# Patient Record
Sex: FEMALE | Race: WHITE | URBAN - METROPOLITAN AREA
[De-identification: names, ages, dates, MRNs, and addresses within clinical notes are randomized per-mention and may not be internally consistent; named-entity substitution may affect disease eponyms.]

---

## 2018-06-25 ENCOUNTER — THERAPY VISIT (OUTPATIENT)
Dept: OCCUPATIONAL THERAPY | Facility: CLINIC | Age: 58
End: 2018-06-25
Payer: COMMERCIAL

## 2018-06-25 DIAGNOSIS — M79.645 PAIN OF LEFT THUMB: ICD-10-CM

## 2018-06-25 DIAGNOSIS — Z47.89 AFTERCARE FOLLOWING SURGERY OF THE MUSCULOSKELETAL SYSTEM: ICD-10-CM

## 2018-06-25 DIAGNOSIS — S62.522A CLOSED DISPLACED FRACTURE OF DISTAL PHALANX OF LEFT THUMB, INITIAL ENCOUNTER: Primary | ICD-10-CM

## 2018-06-25 DIAGNOSIS — S53.32XA TRAUMATIC RUPTURE OF LEFT ULNAR COLLATERAL LIGAMENT, INITIAL ENCOUNTER: ICD-10-CM

## 2018-06-25 PROCEDURE — 97110 THERAPEUTIC EXERCISES: CPT | Mod: GO | Performed by: OCCUPATIONAL THERAPIST

## 2018-06-25 PROCEDURE — 97165 OT EVAL LOW COMPLEX 30 MIN: CPT | Mod: GO | Performed by: OCCUPATIONAL THERAPIST

## 2018-06-25 PROCEDURE — 97140 MANUAL THERAPY 1/> REGIONS: CPT | Mod: GO | Performed by: OCCUPATIONAL THERAPIST

## 2018-06-25 NOTE — MR AVS SNAPSHOT
After Visit Summary   6/25/2018    Nusrat Romero    MRN: [id number]           Patient Information     Date Of Birth          1960        Visit Information        Provider Department      6/25/2018 8:00 AM Yodit Lewis OT [facility]        Today's Diagnoses     Closed displaced fracture of distal phalanx of left thumb, initial encounter    -  1    Traumatic rupture of left ulnar collateral ligament, initial encounter        Pain of left thumb        Aftercare following surgery of the musculoskeletal system           Follow-ups after your visit        Your next 10 appointments already scheduled     Jul 02, 2018  7:30 AM CDT   [facility]ET Hand with Yodit Lewis OT   [facility]    [address]  [address]  [place] [address] [phone]            Jul 09, 2018  7:30 AM CDT   LIZET Hand with Yodit Lewis OT   [facility]    [address]  [address]  [place] [address] [phone]              Who to contact     If you have questions or need follow up information about today's clinic visit or your schedule please contact [facility] directly at [phone].  Normal or non-critical lab and imaging results will be communicated to you by Flypaperhart, letter or phone within 4 business days after the clinic has received the results. If you do not hear from us within 7 days, please contact the clinic through Flypaperhart or phone. If you have a critical or abnormal lab result, we will notify you by phone as soon as possible.  Submit refill requests through The Fabric or call your pharmacy and they will forward the refill request to us. Please allow 3 business days for your refill to be completed.          Additional Information About Your Visit        FlypaperharAmorelie Information     The Fabric lets you send messages to your doctor, view your test results, renew your prescriptions, schedule  "appointments and more. To sign up, go to [email or web address] . Click on \"Log in\" on the left side of the screen, which will take you to the Welcome page. Then click on \"Sign up Now\" on the right side of the page.     You will be asked to enter the access code listed below, as well as some personal information. Please follow the directions to create your username and password.     Your access code is: [email or web address]  Expires: 2018  1:08 PM     Your access code will  in 90 days. If you need help or a new code, please call your [place] clinic or [phone].        Care EveryWhere ID     This is your Care EveryWhere ID. This could be used by other organizations to access your [place] medical records  [id number]         Blood Pressure from Last 3 Encounters:   No data found for BP    Weight from Last 3 Encounters:   No data found for Wt              We Performed the Following     HC OT EVAL, LOW COMPLEXITY     LIZET INITIAL EVAL REPORT     MANUAL THER TECH,1+REGIONS,EA 15 MIN     THERAPEUTIC EXERCISES        Primary Care Provider Fax #    Physician No Ref-Primary [phone]       No address on file        Equal Access to Services     CHRISTIANE BRUNNER : Hadii aad ku hadliliyao Soramuali, waaxda luqadaha, qaybta kaalmada adebettyyada, rosy schilling. So [facility] [phone].    ATENCIÓN: Si habla español, tiene a olea disposición servicios gratuitos de asistencia lingüística. Llame al [phone].    We comply with applicable federal civil rights laws and [place] laws. We do not discriminate on the basis of race, color, national origin, age, disability, sex, sexual orientation, or gender identity.            Thank you!     Thank you for choosing [facility]  for your care. Our goal is always to provide you with excellent care. Hearing back from our patients is one way we can continue to improve our services. Please take a few minutes to complete the written survey that you may " receive in the mail after your visit with us. Thank you!             Your Updated Medication List - Protect others around you: Learn how to safely use, store and throw away your medicines at www.disposemymeds.org.      Notice  As of 6/25/2018  1:08 PM    You have not been prescribed any medications.

## 2018-06-25 NOTE — PROGRESS NOTES
Hand Therapy Initial Evaluation    Current Date:  6/25/2018  Referring Physician: Dr. Barrett    Subjective:  Nusrat Romero is a 57 year old right hand dominant female.    Diagnosis:1. Left thumb comminuted fracture of the distal phalanx        2. Rupture of the ulnar collateral ligament, left thumb  DOI:  4/21/18  DOS:  5/4/18  Procedure: 1. Closed reduction and K-wire pinning of left thumb distal phalanx fracture.                     2. Ulnar collateral ligament repair using a Mitek anchor screw.   Post: 7w 3d    Patient reports symptoms of pain, stiffness/loss of motion, weakness/loss of strength, edema and numbness of the left thumb  which occurred due to a fall after tripping on a curb. Since onset symptoms are gradually getting better.  Special tests:  x-rays, CT scan.  Previous treatment: surgery, cast (removed 6-18-18).    General health as reported by patient is good.  Pertinent medical history includes:Overweight, Smoking  Medical allergies:none.  Surgical history: orthopedic: left knee reconstruction.  Medication history: Priolsec,  Ditropan.    Occupational Profile Information:  Current occupation is [employer]   Currently working in normal job without restrictions  Job Tasks: Computer Work  Barriers include:none  Prior functional level:  no limitations  Mobility: No difficulty  Transportation: drives  Leisure activities/hobbies: fishing, painting ceramics, gardening    Upper Extremity Functional Index Score:  SCORE:   Column Totals: /80: 51   (A lower score indicates greater disability.)      Objective:  Pain:    VAS(0-10) 6/25/18   At Rest:  2/10   With Use:  9/10   At Worst  9/10     Report of Pain:  Location: left thumb   Description: Ache,  Sharp, Shooting,Throbbing  Frequency: Intermittent  Duration: Worse at night  Exacerbated by: Lifting, gripping, pinching, pulling, activity, bumping  Relieved by: ice,  rest  Progression:  gradually improving    ROM:    Thumb 6/25/18 6/25/18   AROM(PROM)  Right Left   MP -10/65 -15/32   IP +60/60 -5/10   RAbd /55 /50   PAbd 50 40   Retropulsion     Opposition  Kapandji Opposition Scale (0-10/10) 10/10 7/10       Strength:  [x]                    Contraindicated    Edema:  []         None   []         Mild    []         Moderate      []         Severe                  Location:  Edema - Measured circumferentially in cm  Date 6/25/18         Right Left Right Left Right Left Right Left Right Left Right Left                   P1 6.1 7.7             IP 6.0 7.7                                              Color/Temperature:     [x]        Normal  []        Abnormal    Wound(s):  [x]          NA       []        Open    []        Closed    []        Sutured        []        Drainage     Scar:  []        NA           [x]      Adherent      []       Non-adherent       []       Raised     []       Flattened        (L) ulnar side of thumb MP        Palpation:  []         Normal        [x]       Tender       []      Mild         []       Moderate [x]       Severe    Location: (L) ulnar MCP joint and dorsal DIP joint area    Sensation: []                   WNL throughout all nerve distributions; per patient report    [x]                   Decreased  []        Median    []        Ulnar    []        Radial nerve distribution  Patient reports numbness on the ulnar side of the thumb from the IP joint to tip    Assessment:  Patient presents with symptoms consistent with diagnosis of thumb fracture and rupture of ulnar collateral ligament,  with surgical  intervention.     Patient's limitations or Problem List includes:  Pain, Decreased ROM/motion, Increased edema, Weakness, Sensory disturbance, Adherent scarring, Decreased  and Decreased pinch of the left thumb which interferes with the patient's ability to perform Self Care Tasks (dressing), Work Tasks, Recreational Activities and Household Chores as compared to previous level of function.    Rehab Potential:  Excellent - Return  to full activity, no limitations    Patient will benefit from skilled Occupational Therapy to increase ROM, flexibility, overall strength,  strength, pinch strength, stability of thumb, coordination and dexterity and decrease pain, edema and adherence of scarring to return to previous activity level and resume normal daily tasks and to reach their rehab potential.    Barriers to Learning:  No barrier    Communication Issues:  Patient appears to be able to clearly communicate and understand verbal and written communication and follow directions correctly.    Chart Review: Brief history including review of medical and/or therapy records relating to the presenting problem and Simple history review with patient    Identified Performance Deficits: dressing, home establishment and management, meal preparation and cleanup and work    Assessment of Occupational Performance:  3-5 Performance Deficits    Clinical Decision Making (Complexity): Low complexity    Treatment Explanation:  The following has been discussed with the patient:  RX ordered/plan of care  Anticipated outcomes  Possible risks and side effects    Frequency:  1 X week, once daily  Duration:  for 8 weeks  Treatment Plan:  Modalities:  Fluidotherapy and Paraffin  Therapeutic Exercise:  AROM, AAROM, PROM, Tendon Gliding, Blocking, Reverse Blocking, Place and Hold and Isotonics  Manual Techniques:  Joint mobilization, Scar mobilization and Manual edema mobilization  Discharge Plan:  Achieve all LTG.  Independent in home treatment program.  Reach maximal therapeutic benefit.      Home Exercise Program:  A/PROM of thumb joints  BROM of thumb MP and IP  Scar massage  Coban wrap for swelling    Next Visit:  Heat if indicated  A/PROM of thumb joints  BROM of thumb MP and IP  Scar massage

## 2018-07-02 ENCOUNTER — THERAPY VISIT (OUTPATIENT)
Dept: OCCUPATIONAL THERAPY | Facility: CLINIC | Age: 58
End: 2018-07-02
Payer: COMMERCIAL

## 2018-07-02 DIAGNOSIS — S62.522A CLOSED DISPLACED FRACTURE OF DISTAL PHALANX OF LEFT THUMB, INITIAL ENCOUNTER: ICD-10-CM

## 2018-07-02 DIAGNOSIS — Z47.89 AFTERCARE FOLLOWING SURGERY OF THE MUSCULOSKELETAL SYSTEM: ICD-10-CM

## 2018-07-02 DIAGNOSIS — M79.645 PAIN OF LEFT THUMB: ICD-10-CM

## 2018-07-02 DIAGNOSIS — S53.32XA TRAUMATIC RUPTURE OF LEFT ULNAR COLLATERAL LIGAMENT, INITIAL ENCOUNTER: ICD-10-CM

## 2018-07-02 PROCEDURE — 97110 THERAPEUTIC EXERCISES: CPT | Mod: GO | Performed by: OCCUPATIONAL THERAPIST

## 2018-07-02 PROCEDURE — 97140 MANUAL THERAPY 1/> REGIONS: CPT | Mod: GO | Performed by: OCCUPATIONAL THERAPIST

## 2018-07-02 NOTE — PROGRESS NOTES
SOAP Note - Hand Therapy - Objective Information    Current Date:  7/2/2018  Referring Physician: Dr. Arnold PIEDRA visit: 7/16/18    Nusrat Romero is a 57 year old right hand dominant female.    Diagnosis:1. Left thumb comminuted fracture of the distal phalanx        2. Rupture of the ulnar collateral ligament, left thumb  DOI:  4/21/18  DOS:  5/4/18  Procedure: 1. Closed reduction and K-wire pinning of left thumb distal phalanx fracture.                     2. Ulnar collateral ligament repair using a Mitek anchor screw.   Post: 8w 3d    Patient reports symptoms of pain, stiffness/loss of motion, weakness/loss of strength, edema and numbness of the left thumb  which occurred due to a fall after tripping on a curb.     Occupational Profile Information:  Current occupation is [employer]    S:  Subjective changes as noted by patient: The thumb is OK but it really swells up by the end of the day. It is stiffer when I wake up in the morning.  Functional changes noted by patient: Using the thumb more while typing.      O:  Pain:    VAS(0-10) 6/25/18 7/2/18   At Rest:  2/10 2/10   With Use:  9/10 5/10   At Worst  9/10 5/10     Report of Pain:  Location: left thumb   Description: Ache,  Sharp, Shooting,Throbbing  Frequency: Intermittent  Duration: Worse at night  Exacerbated by: Lifting, gripping, pinching, pulling, activity, bumping  Relieved by: ice,  rest  Progression:  gradually improving    ROM:    Thumb 6/25/18 6/25/18 7/2/18   AROM(PROM) Right Left Left post   MP -10/65 -15/32 -3/52   IP +60/60 -5/10 -5/28   RAbd /55 /50    PAbd 50 40    Retropulsion      Opposition  Kapandji Opposition Scale (0-10/10) 10/10 7/10        Strength:  [x]                    Contraindicated    Edema:  []         None   []         Mild    []         Moderate      []         Severe                  Location:  Edema - Measured circumferentially in cm  Date 6/25/18 7/2/18        Right Left Right Left Right Left Right Left Right Left  Right Left                   P1 6.1 7.7  7.5           IP 6.0 7.7  7.1                                               Scar:  []        NA           [x]      Adherent      []       Non-adherent       []       Raised     []       Flattened        (L) ulnar side of thumb MP        Palpation:  []         Normal        [x]       Tender       []      Mild         []       Moderate [x]       Severe    Location: (L) ulnar MCP joint and dorsal DIP joint area    Sensation: []                   WNL throughout all nerve distributions; per patient report    [x]                   Decreased  []        Median    []        Ulnar    []        Radial nerve distribution  Patient reports numbness on the ulnar side of the thumb from the IP joint to tip  Please refer to the daily flowsheet for treatment provided today.     Home Exercise Program:  A/PROM of thumb joints  BROM of thumb MP and IP  Scar massage  Coban wrap for swelling  PROM of thumb MP & IP as tolerated    Next Visit:  Heat if indicated  A/PROM of thumb joints  BROM of thumb MP and IP  Scar massage

## 2018-07-02 NOTE — MR AVS SNAPSHOT
After Visit Summary   7/2/2018    Nusrat Romero    MRN: [id number]           Patient Information     Date Of Birth          1960        Visit Information        Provider Department      7/2/2018 7:30 AM Yodit Lewis, OT [facility]        Today's Diagnoses     Closed displaced fracture of distal phalanx of left thumb, initial encounter        Traumatic rupture of left ulnar collateral ligament, initial encounter        Pain of left thumb        Aftercare following surgery of the musculoskeletal system           Follow-ups after your visit        Your next 10 appointments already scheduled     Jul 09, 2018  7:30 AM CDT   LIZET Hand with Yodit Lewis, OT   [facility] ([facility] [facility])    [address]  [address]  [place] [address] [phone]            Jul 19, 2018  7:30 AM CDT   LIZET Hand with Yodit Lewis, OT   [facility] ([facility] [facility])    [address]  [address]  [place] [address] [phone]            Jul 23, 2018  7:30 AM CDT   LIZET Hand with Yodit Lewis, OT   [facility] ([facility])    [address]  [address]  [place] [address] [phone]              Who to contact     If you have questions or need follow up information about today's clinic visit or your schedule please contact [facility] directly at [phone].  Normal or non-critical lab and imaging results will be communicated to you by MyChart, letter or phone within 4 business days after the clinic has received the results. If you do not hear from us within 7 days, please contact the clinic through MyChart or phone. If you have a critical or abnormal lab result, we will notify you by phone as soon as possible.  Submit refill requests through LibertadCard or call your pharmacy and they will forward the refill request to us. Please allow 3 business days for your refill to be  "completed.          Additional Information About Your Visit        IngenyharMyClean Information     SkillSurvey lets you send messages to your doctor, view your test results, renew your prescriptions, schedule appointments and more. To sign up, go to [email or web address] . Click on \"Log in\" on the left side of the screen, which will take you to the Welcome page. Then click on \"Sign up Now\" on the right side of the page.     You will be asked to enter the access code listed below, as well as some personal information. Please follow the directions to create your username and password.     Your access code is: [email or web address]  Expires: 2018  1:08 PM     Your access code will  in 90 days. If you need help or a new code, please call your [place] clinic or [phone].        Care EveryWhere ID     This is your Care EveryWhere ID. This could be used by other organizations to access your [place] medical records  [id number]         Blood Pressure from Last 3 Encounters:   No data found for BP    Weight from Last 3 Encounters:   No data found for Wt              We Performed the Following     MANUAL THER TECH,1+REGIONS,EA 15 MIN     THERAPEUTIC EXERCISES        Primary Care Provider Fax #    Physician No Ref-Primary [phone]       No address on file        Equal Access to Services     KIP BRUNNER : Hadii brian ku hadasho Soramuali, waaxda luqadaha, qaybta kaalmada adeegyada, rosy ryan . So [facility] [phone].    ATENCIÓN: Si habla español, tiene a olea disposición servicios gratuitos de asistencia lingüística. Llame al [phone].    We comply with applicable federal civil rights laws and [place] laws. We do not discriminate on the basis of race, color, national origin, age, disability, sex, sexual orientation, or gender identity.            Thank you!     Thank you for choosing [facility]  for your care. Our goal is always to provide you with excellent care. Hearing back " from our patients is one way we can continue to improve our services. Please take a few minutes to complete the written survey that you may receive in the mail after your visit with us. Thank you!             Your Updated Medication List - Protect others around you: Learn how to safely use, store and throw away your medicines at www.disposemymeds.org.      Notice  As of 7/2/2018  1:04 PM    You have not been prescribed any medications.

## 2018-07-09 ENCOUNTER — THERAPY VISIT (OUTPATIENT)
Dept: OCCUPATIONAL THERAPY | Facility: CLINIC | Age: 58
End: 2018-07-09
Payer: COMMERCIAL

## 2018-07-09 DIAGNOSIS — S62.522A CLOSED DISPLACED FRACTURE OF DISTAL PHALANX OF LEFT THUMB, INITIAL ENCOUNTER: ICD-10-CM

## 2018-07-09 DIAGNOSIS — M79.645 PAIN OF LEFT THUMB: ICD-10-CM

## 2018-07-09 DIAGNOSIS — S53.32XA TRAUMATIC RUPTURE OF LEFT ULNAR COLLATERAL LIGAMENT, INITIAL ENCOUNTER: ICD-10-CM

## 2018-07-09 DIAGNOSIS — Z47.89 AFTERCARE FOLLOWING SURGERY OF THE MUSCULOSKELETAL SYSTEM: ICD-10-CM

## 2018-07-09 PROCEDURE — 97140 MANUAL THERAPY 1/> REGIONS: CPT | Mod: GO | Performed by: OCCUPATIONAL THERAPIST

## 2018-07-09 PROCEDURE — 97110 THERAPEUTIC EXERCISES: CPT | Mod: GO | Performed by: OCCUPATIONAL THERAPIST

## 2018-07-09 NOTE — MR AVS SNAPSHOT
After Visit Summary   7/9/2018    Nusrat Romero    MRN: [id number]           Patient Information     Date Of Birth          1960        Visit Information        Provider Department      7/9/2018 7:30 AM Yodit Lewis, OT [facility]        Today's Diagnoses     Closed displaced fracture of distal phalanx of left thumb, initial encounter        Traumatic rupture of left ulnar collateral ligament, initial encounter        Pain of left thumb        Aftercare following surgery of the musculoskeletal system           Follow-ups after your visit        Your next 10 appointments already scheduled     Jul 19, 2018  7:30 AM CDT   LIZET Hand with Yodit Lewis OT   [facility]    [address]  [address]  [place] [address] [phone]            Jul 23, 2018  7:30 AM CDT   LIZET Hand with Yodit Lewis OT   [facility] ([facility]    [address]  [address]  [place] [address] [phone]              Who to contact     If you have questions or need follow up information about today's clinic visit or your schedule please contact [facility] directly at [phone].  Normal or non-critical lab and imaging results will be communicated to you by unamiahart, letter or phone within 4 business days after the clinic has received the results. If you do not hear from us within 7 days, please contact the clinic through unamiahart or phone. If you have a critical or abnormal lab result, we will notify you by phone as soon as possible.  Submit refill requests through Sinequa or call your pharmacy and they will forward the refill request to us. Please allow 3 business days for your refill to be completed.          Additional Information About Your Visit        unamiaharNeurotrope Bioscience Information     Sinequa lets you send messages to your doctor, view your test results, renew your prescriptions, schedule appointments  "and more. To sign up, go to [email or web address] . Click on \"Log in\" on the left side of the screen, which will take you to the Welcome page. Then click on \"Sign up Now\" on the right side of the page.     You will be asked to enter the access code listed below, as well as some personal information. Please follow the directions to create your username and password.     Your access code is: [email or web address]  Expires: 2018  1:08 PM     Your access code will  in 90 days. If you need help or a new code, please call your [place] clinic or [phone].        Care EveryWhere ID     This is your Care EveryWhere ID. This could be used by other organizations to access your [place] medical records  [id number]         Blood Pressure from Last 3 Encounters:   No data found for BP    Weight from Last 3 Encounters:   No data found for Wt              We Performed the Following     MANUAL THER TECH,1+REGIONS,EA 15 MIN     THERAPEUTIC EXERCISES        Primary Care Provider Fax #    Physician No Ref-Primary [phone]       No address on file        Equal Access to Services     CHRISTIANE [facility] Center[facility]RETT : Hadii brian Marin, waaxda faiza, qaybta deloris barriga, rosy ryan . So [facility] [phone].    ATENCIÓN: Si habla español, tiene a olea disposición servicios gratuitos de asistencia lingüística. [facility] [phone].    We comply with applicable federal civil rights laws and [place] laws. We do not discriminate on the basis of race, color, national origin, age, disability, sex, sexual orientation, or gender identity.            Thank you!     Thank you for choosing [facility]  for your care. Our goal is always to provide you with excellent care. Hearing back from our patients is one way we can continue to improve our services. Please take a few minutes to complete the written survey that you may receive in the mail after your visit with us. Thank you!             Your " Updated Medication List - Protect others around you: Learn how to safely use, store and throw away your medicines at www.disposemymeds.org.      Notice  As of 7/9/2018  8:57 AM    You have not been prescribed any medications.

## 2018-07-09 NOTE — PROGRESS NOTES
SOAP Note - Hand Therapy - Objective Information    Current Date:  2018  Referring Physician: Dr. Arnold PIEDRA visit: 18    Nusrat Romero is a 57 year old right hand dominant female.    Diagnosis:1. Left thumb comminuted fracture of the distal phalanx        2. Rupture of the ulnar collateral ligament, left thumb  DOI:  18  DOS:  18  Procedure: 1. Closed reduction and K-wire pinning of left thumb distal phalanx fracture.                     2. Ulnar collateral ligament repair using a Mitek anchor screw.   Post: 9w 3d    Patient reports symptoms of pain, stiffness/loss of motion, weakness/loss of strength, edema and numbness of the left thumb which occurred due to a fall after tripping on a curb.     Occupational Profile Information:  Current occupation is [employer]    S:  Subjective changes as noted by patient: The thumb became a little sore after gardening.  Functional changes noted by patient: I did a lot of yard work over the weekend.      O:  Pain:    VAS(0-10) 18   At Rest:  2/10 2/10 1/10   With Use:  9/10 5/10 310   At Worst  9/10 5/10 7/10     Report of Pain:  Location: left thumb   Description: Ache,  Sharp, Shooting,Throbbing  Frequency: Intermittent  Duration: Worse at day  Exacerbated by: Lifting, gripping, pinching, pulling, activity, bumping  Relieved by: ice,  rest  Progression:  gradually improving    ROM:    Thumb 18   AROM(PROM) Right Left Left post Left   MP -10/65 -15/32 -3/ /55 pre   IP +60/60 -5/10 -5/28 /15(25)pre   RAbd /55 /50     PAbd 50 40     Retropulsion       Opposition  Kapandji Opposition Scale (0-10/10) 10/10 7/10       Strength:   Pain free (Measured in pounds)   18   Trials Right Left   1  2  3  Av  56  62  60 28  27  31  29     Lat Pinch 18   Trials Right Left   1  2  3  Av 8     3 Pt.  Pinch 18   Trials Right Left   1  2  3  Av 6       Edema:  []         None   []          Mild    []         Moderate      []         Severe                  Location:  Edema - Measured circumferentially in cm  Date 6/25/18 7/2/18 7/9/18       Right Left Right Left Right Left Right Left Right Left Right Left                   P1 6.1 7.7  7.5  7.0         IP 6.0 7.7  7.1  6.9                                             Scar:  []        NA           [x]      Adherent      []       Non-adherent       []       Raised     []       Flattened        (L) ulnar side of thumb MP        Palpation:  []         Normal        [x]       Tender       []      Mild         []       Moderate [x]       Severe    Location: (L) ulnar MCP joint and dorsal DIP joint area    Sensation: []                   WNL throughout all nerve distributions; per patient report    [x]                   Decreased  []        Median    []        Ulnar    []        Radial nerve distribution  Patient reports numbness on the ulnar side of the thumb from the IP joint to tip  Please refer to the daily flowsheet for treatment provided today.     Home Exercise Program:  A/PROM of thumb joints  BROM of thumb MP and IP  Scar massage  Coban wrap for swelling  PROM of thumb MP & IP as tolerated    Next Visit:  Heat if indicated  A/PROM of thumb joints  BROM of thumb MP and IP  Scar massage

## 2018-07-16 ENCOUNTER — THERAPY VISIT (OUTPATIENT)
Dept: OCCUPATIONAL THERAPY | Facility: CLINIC | Age: 58
End: 2018-07-16
Payer: COMMERCIAL

## 2018-07-16 DIAGNOSIS — M79.645 PAIN OF LEFT THUMB: ICD-10-CM

## 2018-07-16 DIAGNOSIS — Z47.89 AFTERCARE FOLLOWING SURGERY OF THE MUSCULOSKELETAL SYSTEM: ICD-10-CM

## 2018-07-16 DIAGNOSIS — S62.522A CLOSED DISPLACED FRACTURE OF DISTAL PHALANX OF LEFT THUMB, INITIAL ENCOUNTER: ICD-10-CM

## 2018-07-16 DIAGNOSIS — S53.32XA TRAUMATIC RUPTURE OF LEFT ULNAR COLLATERAL LIGAMENT, INITIAL ENCOUNTER: ICD-10-CM

## 2018-07-16 PROCEDURE — 97140 MANUAL THERAPY 1/> REGIONS: CPT | Mod: GO | Performed by: OCCUPATIONAL THERAPIST

## 2018-07-16 PROCEDURE — 97110 THERAPEUTIC EXERCISES: CPT | Mod: GO | Performed by: OCCUPATIONAL THERAPIST

## 2018-07-16 NOTE — PROGRESS NOTES
SOAP Note - Hand Therapy - Objective Information    Current Date:  2018  Referring Physician: Dr. Arnold PIEDRA visit: 9/10/18    Nusrat Romero is a 57 year old right hand dominant female.    Diagnosis:1. Left thumb comminuted fracture of the distal phalanx        2. Rupture of the ulnar collateral ligament, left thumb  DOI:  18  DOS:  18  Procedure: 1. Closed reduction and K-wire pinning of left thumb distal phalanx fracture.                     2. Ulnar collateral ligament repair using a Mitek anchor screw.   Post: 10w 3d    Patient reports symptoms of pain, stiffness/loss of motion, weakness/loss of strength, edema and numbness of the left thumb which occurred due to a fall after tripping on a curb.     Occupational Profile Information:  Current occupation is [employer]    S:  Subjective changes as noted by patient: The is sore today and swollen.  The MD said I should get more motion of the end joint but it is going to take time.  Functional changes noted by patient: I worked outside over weekend      O:  Pain:    VAS(0-10) 18   At Rest:  2/10 2/10 1/10 510   With Use:  9/10 5/10 3/10 810   At Worst  9/10 5/10 7/10 8/10     Report of Pain:  Location: left thumb   Description: Ache,  Sharp, Shooting,Throbbing  Frequency: Intermittent  Duration: Worse at day  Exacerbated by: Lifting, gripping, pinching, pulling, activity, bumping  Relieved by: ice,  rest  Progression:  gradually improving    ROM:    Thumb 18   AROM(PROM) Right Left Left post Left Left   MP -10/65 -15/32 -352 /55 pre /58 post   IP +60/60 -10 -5/28 /15(25)pre 0(+30)/20 post   RAbd /55 /50      PAbd 50 40      Retropulsion        Opposition  Kapandji Opposition Scale (0-10/10) 10/10 7/10        Strength:   Pain free (Measured in pounds)   18   Trials Right Left   1  2  3  Av  56  62  60 28  27  31  29     Lat Pinch 18   Trials Right Left    1  2  3  Av 8     3 Pt.  Pinch 18   Trials Right Left   1  2  3  Av 6       Edema:  []         None   []         Mild    []         Moderate      []         Severe                  Location:  Edema - Measured circumferentially in cm  Date 18      Right Left Right Left Right Left Right Left Right Left Right Left                   P1 6.1 7.7  7.5  7.0  7.2       IP 6.0 7.7  7.1  6.9                                             Scar:  []        NA           [x]      Adherent      []       Non-adherent       []       Raised     []       Flattened        (L) ulnar side of thumb MP        Palpation:  []         Normal        [x]       Tender       []      Mild         []       Moderate [x]       Severe    Location: (L) ulnar MCP joint and dorsal DIP joint area    Sensation: []                   WNL throughout all nerve distributions; per patient report    [x]                   Decreased  []        Median    []        Ulnar    []        Radial nerve distribution  Patient reports numbness on the ulnar dorsal side of the thumb from the IP joint to tip  Please refer to the daily flowsheet for treatment provided today.     Home Exercise Program:  A/PROM of thumb joints  BROM of thumb MP and IP  Scar massage  Coban wrap for swelling  PROM of thumb MP & IP as tolerated    Next Visit:  Heat if indicated  A/PROM of thumb joints  BROM of thumb MP and IP  Scar massage

## 2018-07-16 NOTE — MR AVS SNAPSHOT
"              After Visit Summary   7/16/2018    Nusrat Romero    MRN: [id number]           Patient Information     Date Of Birth          1960        Visit Information        Provider Department      7/16/2018 5:00 PM Yodit Lewis OT [facility]        Today's Diagnoses     Closed displaced fracture of distal phalanx of left thumb, initial encounter        Traumatic rupture of left ulnar collateral ligament, initial encounter        Pain of left thumb        Aftercare following surgery of the musculoskeletal system           Follow-ups after your visit        Your next 10 appointments already scheduled     Jul 23, 2018  7:30 AM CDT   LIZET Hand with Yodit Lewis OT   [facility] ([facility])    [address]  [address]  [place] [address] [phone]              Who to contact     If you have questions or need follow up information about today's clinic visit or your schedule please contact [facility] directly at [phone].  Normal or non-critical lab and imaging results will be communicated to you by Organic Societyhart, letter or phone within 4 business days after the clinic has received the results. If you do not hear from us within 7 days, please contact the clinic through EventRadart or phone. If you have a critical or abnormal lab result, we will notify you by phone as soon as possible.  Submit refill requests through Snugg Home or call your pharmacy and they will forward the refill request to us. Please allow 3 business days for your refill to be completed.          Additional Information About Your Visit        Organic SocietyharAutomattic Information     Snugg Home lets you send messages to your doctor, view your test results, renew your prescriptions, schedule appointments and more. To sign up, go to [email or web address] . Click on \"Log in\" on the left side of the screen, which will take you to the Welcome page. Then click on \"Sign up Now\" on the right side of " the page.     You will be asked to enter the access code listed below, as well as some personal information. Please follow the directions to create your username and password.     Your access code is: [email or web address]  Expires: 2018  1:08 PM     Your access code will  in 90 days. If you need help or a new code, please call your [facility] clinic or [phone].        Care EveryWhere ID     This is your Care EveryWhere ID. This could be used by other organizations to access your [facility] medical records  [id number]         Blood Pressure from Last 3 Encounters:   No data found for BP    Weight from Last 3 Encounters:   No data found for Wt              We Performed the Following     MANUAL THER TECH,1+REGIONS,EA 15 MIN     THERAPEUTIC EXERCISES        Primary Care Provider Fax #    Physician No Ref-Primary [phone]       No address on file        Equal Access to Services     CHRISTIANE BRUNNER : Hadii brian escobaro Somelchor, waaxda luqadaha, qaybta kaalmada linus, rosy ryan . So [facility] [phone].    ATENCIÓN: Si habla español, tiene a olea disposición servicios gratuitos de asistencia lingüística. Llame al [phone].    We comply with applicable federal civil rights laws and [place] laws. We do not discriminate on the basis of race, color, national origin, age, disability, sex, sexual orientation, or gender identity.            Thank you!     Thank you for choosing [facility]  for your care. Our goal is always to provide you with excellent care. Hearing back from our patients is one way we can continue to improve our services. Please take a few minutes to complete the written survey that you may receive in the mail after your visit with us. Thank you!             Your Updated Medication List - Protect others around you: Learn how to safely use, store and throw away your medicines at www.disposemymeds.org.      Notice  As of 2018  8:06 PM    You have not  been prescribed any medications.

## 2018-07-23 ENCOUNTER — THERAPY VISIT (OUTPATIENT)
Dept: OCCUPATIONAL THERAPY | Facility: CLINIC | Age: 58
End: 2018-07-23
Payer: COMMERCIAL

## 2018-07-23 DIAGNOSIS — S53.32XA TRAUMATIC RUPTURE OF LEFT ULNAR COLLATERAL LIGAMENT, INITIAL ENCOUNTER: ICD-10-CM

## 2018-07-23 DIAGNOSIS — Z47.89 AFTERCARE FOLLOWING SURGERY OF THE MUSCULOSKELETAL SYSTEM: ICD-10-CM

## 2018-07-23 DIAGNOSIS — M79.645 PAIN OF LEFT THUMB: ICD-10-CM

## 2018-07-23 DIAGNOSIS — S62.522A CLOSED DISPLACED FRACTURE OF DISTAL PHALANX OF LEFT THUMB, INITIAL ENCOUNTER: ICD-10-CM

## 2018-07-23 PROCEDURE — 97110 THERAPEUTIC EXERCISES: CPT | Mod: GO | Performed by: OCCUPATIONAL THERAPIST

## 2018-07-23 PROCEDURE — 97140 MANUAL THERAPY 1/> REGIONS: CPT | Mod: GO | Performed by: OCCUPATIONAL THERAPIST

## 2018-07-23 PROCEDURE — 97530 THERAPEUTIC ACTIVITIES: CPT | Mod: GO | Performed by: OCCUPATIONAL THERAPIST

## 2018-07-23 NOTE — PROGRESS NOTES
SOAP Note - Hand Therapy - Objective Information    Current Date:  2018  Referring Physician: Dr. Arnold PIEDRA visit: 9/10/18    Nusrat Romero is a 57 year old right hand dominant female.    Diagnosis:1. Left thumb comminuted fracture of the distal phalanx        2. Rupture of the ulnar collateral ligament, left thumb  DOI:  18  DOS:  18  Procedure: 1. Closed reduction and K-wire pinning of left thumb distal phalanx fracture.                     2. Ulnar collateral ligament repair using a Mitek anchor screw.   Post: 11w 3d    Patient reports symptoms of pain, stiffness/loss of motion, weakness/loss of strength, edema and numbness of the left thumb which occurred due to a fall after tripping on a curb.     Occupational Profile Information:  Current occupation is [employer]    S:  Subjective changes as noted by patient: the thumb is doing OK. The thumb looks better today.  I have been twisting and turning the thumb but it just doesn't want to go.  Functional changes noted by patient: no changes      O:  Pain:    VAS(0-10) 18   At Rest:  2/10 2/10 1/10 5/10 1/10   With Use:  9/10 510 3/10 8/10 310   At Worst  9/10 5/10 7/10 8/10 7/10     Report of Pain:  Location: left thumb   Description: Ache,  Sharp, Shooting,Throbbing  Frequency: Intermittent  Duration: Worse at day  Exacerbated by: Lifting, gripping, pinching, pulling, activity, bumping  Relieved by: ice,  rest  Progression:  gradually improving    ROM:    Thumb 18   AROM(PROM) Right Left Left post Left Left Left   MP -10/65 -15/ -3/52 / pre /58 post    IP +60/60 -5/10 -5/28 /15(25)pre 0(+30)/20 post /24(26)   RAbd /55 /50       PAbd 50 40       Retropulsion         Opposition  Kapandji Opposition Scale (0-10/10) 10/10 7/10         Strength:   Pain free (Measured in pounds)   18   Trials Right Left Left   1  2  3  Av  56  62  60  28  27  31  29 43  52  48  47     Lat Pinch 18   Trials Right Left Left   1  2  3  Av 8 10     3 Pt.  Pinch 18   Trials Right Left Left   1  2  3  Av 6 7+       Edema:  []         None   []         Mild    []         Moderate      []         Severe                  Location:  Edema - Measured circumferentially in cm  Date 18      Right Left Right Left Right Left Right Left Right Left Right Left                   P1 6.1 7.7  7.5  7.0  7.2       IP 6.0 7.7  7.1  6.9                                             Scar:  []        NA           [x]      Adherent      []       Non-adherent       []       Raised     []       Flattened        (L) ulnar side of thumb MP        Palpation:  []         Normal        [x]       Tender       []      Mild         []       Moderate [x]       Severe    Location: (L) ulnar MCP joint and dorsal DIP joint area    Sensation: []                   WNL throughout all nerve distributions; per patient report    [x]                   Decreased  []        Median    []        Ulnar    []        Radial nerve distribution  Patient reports numbness on the ulnar dorsal side of the thumb from the IP joint to tip  Please refer to the daily flowsheet for treatment provided today.     Home Exercise Program:  A/PROM of thumb joints  BROM of thumb MP and IP  Scar massage  Coban wrap for swelling  PROM of thumb MP & IP as tolerated    Next Visit:  Heat if indicated  A/PROM of thumb joints  BROM of thumb MP and IP  Scar massage  Consider static progressive thumb flex IP orthosis

## 2018-07-23 NOTE — MR AVS SNAPSHOT
After Visit Summary   7/23/2018    Nusrat Romero    MRN: [id number]           Patient Information     Date Of Birth          1960        Visit Information        Provider Department      7/23/2018 7:30 AM Yodit Lewis OT [facility]        Today's Diagnoses     Closed displaced fracture of distal phalanx of left thumb, initial encounter        Traumatic rupture of left ulnar collateral ligament, initial encounter        Pain of left thumb        Aftercare following surgery of the musculoskeletal system           Follow-ups after your visit        Your next 10 appointments already scheduled     Jul 30, 2018  8:00 AM CDT   LIZET Hand with Yodit Lewis, OT   [facility] ([facility])    [address]  [address]  [place] [address]   [phone]            Aug 06, 2018  8:00 AM CDT   LIZET Hand with Yodit eLwis, OT   [facility] ([facility])    [address]  [address]  [place] [address] [phone]            Aug 13, 2018  8:00 AM CDT   LIZET Hand with Yodit Lewis, OT   [facility] ([facility])    [address]  [address]  [place] [address]   [phone]            Aug 20, 2018  8:00 AM CDT   LIZET Hand with Yodit Lewis, OT   [facility] ([facility])    [address]  [address]  [place] [address]   [phone]            Aug 27, 2018  8:00 AM CDT   LIZET Hand with Yodit Lewis, OT   [facility] ([facility])    [address]  [address]  [place] [address]   [phone]            Sep 10, 2018  4:30 PM CDT   LIZET Hand with Yodit Lewis, OT   [facility] ([facility])    [address]  [address]  [place] [address]   [phone]            Sep 17, 2018  8:00 AM CDT   LIZET Hand with Yodit Lewis OT   [facility] ([facility]  "[facility])    [address]  [address]  [place] [address]   [phone]            Sep 24, 2018  8:00 AM CDT   LIZET Hand with Yodit Lewis, OT   [facility] ([facility])    [address]  [address]  [place] [address]   [phone]              Who to contact     If you have questions or need follow up information about today's clinic visit or your schedule please contact [facility] directly at [phone].  Normal or non-critical lab and imaging results will be communicated to you by HireHivehart, letter or phone within 4 business days after the clinic has received the results. If you do not hear from us within 7 days, please contact the clinic through HireHivehart or phone. If you have a critical or abnormal lab result, we will notify you by phone as soon as possible.  Submit refill requests through [email or web address] or call your pharmacy and they will forward the refill request to us. Please allow 3 business days for your refill to be completed.          Additional Information About Your Visit        HireHiveharRavgen Information     [email or web address] lets you send messages to your doctor, view your test results, renew your prescriptions, schedule appointments and more. To sign up, go to [email or web address] . Click on \"Log in\" on the left side of the screen, which will take you to the Welcome page. Then click on \"Sign up Now\" on the right side of the page.     You will be asked to enter the access code listed below, as well as some personal information. Please follow the directions to create your username and password.     Your access code is: [email or web address]  Expires: 2018  1:08 PM     Your access code will  in 90 days. If you need help or a new code, please call your [facility] clinic or [phone].        Care EveryWhere ID     This is your Care EveryWhere ID. This could be used by other organizations to access your [facility] medical records  [id number]         Blood " Pressure from Last 3 Encounters:   No data found for BP    Weight from Last 3 Encounters:   No data found for Wt              We Performed the Following     MANUAL THER TECH,1+REGIONS,EA 15 MIN     THERAPEUTIC ACTIVITIES     THERAPEUTIC EXERCISES        Primary Care Provider Fax #    Physician No Ref-Primary [phone]       No address on file        Equal Access to Services     KIP BRUNNER : Hadii aad ku hadasho Soomaali, waaxda luqadaha, qaybta kaalmada adeegyada, waxsurjit goodmanin haychocon veto smallwoodsamiilya ryan . So [facility] [phone].    ATENCIÓN: Si habla español, tiene a olea disposición servicios gratuitos de asistencia lingüística. Llame al [phone].    We comply with applicable federal civil rights laws and [place] laws. We do not discriminate on the basis of race, color, national origin, age, disability, sex, sexual orientation, or gender identity.            Thank you!     Thank you for choosing [facility]  for your care. Our goal is always to provide you with excellent care. Hearing back from our patients is one way we can continue to improve our services. Please take a few minutes to complete the written survey that you may receive in the mail after your visit with us. Thank you!             Your Updated Medication List - Protect others around you: Learn how to safely use, store and throw away your medicines at www.disposemymeds.org.      Notice  As of 7/23/2018  8:38 AM    You have not been prescribed any medications.

## 2018-07-30 ENCOUNTER — THERAPY VISIT (OUTPATIENT)
Dept: OCCUPATIONAL THERAPY | Facility: CLINIC | Age: 58
End: 2018-07-30
Payer: COMMERCIAL

## 2018-07-30 DIAGNOSIS — M79.645 PAIN OF LEFT THUMB: ICD-10-CM

## 2018-07-30 DIAGNOSIS — Z47.89 AFTERCARE FOLLOWING SURGERY OF THE MUSCULOSKELETAL SYSTEM: ICD-10-CM

## 2018-07-30 DIAGNOSIS — S53.32XA TRAUMATIC RUPTURE OF LEFT ULNAR COLLATERAL LIGAMENT, INITIAL ENCOUNTER: ICD-10-CM

## 2018-07-30 DIAGNOSIS — S62.522A CLOSED DISPLACED FRACTURE OF DISTAL PHALANX OF LEFT THUMB, INITIAL ENCOUNTER: ICD-10-CM

## 2018-07-30 PROCEDURE — 97760 ORTHOTIC MGMT&TRAING 1ST ENC: CPT | Mod: GO | Performed by: OCCUPATIONAL THERAPIST

## 2018-07-30 NOTE — PROGRESS NOTES
SOAP Note - Hand Therapy - Objective Information    Current Date:  7/30/2018  Referring Physician: Dr. Arnold PIEDRA visit: 9/10/18    Nusrat Romreo is a 57 year old right hand dominant female.    Diagnosis:1. Left thumb comminuted fracture of the distal phalanx        2. Rupture of the ulnar collateral ligament, left thumb  DOI:  4/21/18  DOS:  5/4/18  Procedure: 1. Closed reduction and K-wire pinning of left thumb distal phalanx fracture.                     2. Ulnar collateral ligament repair using a Mitek anchor screw.   Post: 12w 3d    Patient reports symptoms of pain, stiffness/loss of motion, weakness/loss of strength, edema and numbness of the left thumb which occurred due to a fall after tripping on a curb.     Occupational Profile Information:  Current occupation is [employer]    S:  Subjective changes as noted by patient:The thumb is stiff and sore today.  By the end of the day yesterday the thumb was more sore and swollen.  Functional changes noted by patient: no changes      O:  Pain:    VAS(0-10) 6/25/18 7/2/18 7/9/18 7/16/18 7/23/18 7/30/18   At Rest:  2/10 2/10 1/10 5/10 1/10 1/10   With Use:  9/10 5/10 3/10 8/10 3/10 5/10   At Worst  9/10 5/10 7/10 8/10 7/10 7/10     Report of Pain:  Location: left thumb   Description: Ache,  Sharp, Shooting,Throbbing  Frequency: Intermittent  Duration: Worse at day  Exacerbated by: Lifting, gripping, pinching, pulling, activity, bumping  Relieved by: ice,  rest  Progression:  gradually improving    ROM:    Thumb 6/25/18 6/25/18 7/2/18 7/9/18 7/16/18 7/23/18 7/30/18   AROM(PROM) Right Left Left post Left Left Left Left   MP -10/65 -15/32 -3/52 /55 pre /58 post  /60 pre   IP +60/60 -5/10 -5/28 /15(25)pre 0(+30)/20 post /24(26) /17 pre   RAbd /55 /50        PAbd 50 40        Retropulsion          Opposition  Kapandji Opposition Scale (0-10/10) 10/10 7/10     7/10     Strength:   Pain free (Measured in pounds)   7/9/18 7/23/18   Trials Right Left Left    1  2  3  Av  56  62  60 28  27  31  29 43  52  48  47     Lat Pinch 18   Trials Right Left Left   1  2  3  Av 8 10     3 Pt.  Pinch 18   Trials Right Left Left   1  2  3  Av 6 7+       Edema:  []         None   []         Mild    []         Moderate      []         Severe                  Location:  Edema - Measured circumferentially in cm  Date 18      Right Left Right Left Right Left Right Left Right Left Right Left                   P1 6.1 7.7  7.5  7.0  7.2       IP 6.0 7.7  7.1  6.9                                             Scar:  []        NA           [x]      Adherent      []       Non-adherent       []       Raised     []       Flattened        (L) ulnar side of thumb MP        Palpation:  []         Normal        [x]       Tender       []      Mild         []       Moderate [x]       Severe    Location: (L) ulnar MCP joint and dorsal DIP joint area    Sensation: []                   WNL throughout all nerve distributions; per patient report    [x]                   Decreased  []        Median    []        Ulnar    []        Radial nerve distribution  Patient reports numbness on the ulnar dorsal side of the thumb from the IP joint to tip  Please refer to the daily flowsheet for treatment provided today.     Home Exercise Program:  A/PROM of thumb joints  BROM of thumb MP and IP  Scar massage  Coban wrap for swelling  PROM of thumb MP & IP as tolerated  18:  Dynamic IP flexion orthosis Begin to wear 30 min intervals 3-4 times per day and increase time as tolerated    Next Visit:  Heat if indicated  A/PROM of thumb joints  BROM of thumb MP and IP  Scar massage

## 2018-07-30 NOTE — MR AVS SNAPSHOT
After Visit Summary   7/30/2018    Nusrat Romero    MRN: [id number]           Patient Information     Date Of Birth          1960        Visit Information        Provider Department      7/30/2018 8:00 AM Yodit Lewis OT [facility]        Today's Diagnoses     Closed displaced fracture of distal phalanx of left thumb, initial encounter        Traumatic rupture of left ulnar collateral ligament, initial encounter        Pain of left thumb        Aftercare following surgery of the musculoskeletal system           Follow-ups after your visit        Your next 10 appointments already scheduled     Aug 06, 2018  8:00 AM CDT   LIZET Hand with Yodit Lewis, OT   [facility] ([facility]    [address]  [address]  [place] [address]   [phone]            Aug 13, 2018  8:00 AM CDT   LIZET Hand with Yodit Lewis, OT   [facility] ([facility])    [address]  [address]  [place] [address] [phone]            Aug 20, 2018  8:00 AM CDT   LIZET Hand with Yodit Lewis, OT   [facility] ([facility])    [address]  [address]  [place] [address]   [phone]            Aug 27, 2018  8:00 AM CDT   LIZET Hand with Yodit Lewis, OT   [facility] ([facility])    [address]  [address]  [place] [address] [phone]            Sep 10, 2018  4:30 PM CDT   LIZET Hand with Yodit Lewis, OT   [facility] ([facility])    [address]  [address]  [place] [address]   [phone]            Sep 17, 2018  8:00 AM CDT   LIZET Hand with Yodit Lewis, OT   [facility] ([facility])    [address]  [address]  [place] [address]   [phone]            Sep 24, 2018  8:00 AM CDT   LIZET Hand with Yodit Lewis OT   [facility]  "[place])    [address]  [address]  [place] [address] [phone]              Who to contact     If you have questions or need follow up information about today's clinic visit or your schedule please contact [facility] directly at [phone].  Normal or non-critical lab and imaging results will be communicated to you by MyChart, letter or phone within 4 business days after the clinic has received the results. If you do not hear from us within 7 days, please contact the clinic through MyChart or phone. If you have a critical or abnormal lab result, we will notify you by phone as soon as possible.  Submit refill requests through [employer] or call your pharmacy and they will forward the refill request to us. Please allow 3 business days for your refill to be completed.          Additional Information About Your Visit        MyChart Information     [employer] lets you send messages to your doctor, view your test results, renew your prescriptions, schedule appointments and more. To sign up, go to [email or web address] . Click on \"Log in\" on the left side of the screen, which will take you to the Welcome page. Then click on \"Sign up Now\" on the right side of the page.     You will be asked to enter the access code listed below, as well as some personal information. Please follow the directions to create your username and password.     Your access code is: [email or web address]  Expires: 2018  1:08 PM     Your access code will  in 90 days. If you need help or a new code, please call your [facility] clinic or [phone].        Care EveryWhere ID     This is your Care EveryWhere ID. This could be used by other organizations to access your [facility] medical records  [id number]         Blood Pressure from Last 3 Encounters:   No data found for BP    Weight from Last 3 Encounters:   No data found for Wt              We Performed the Following     Orth[employer]rication        Primary Care Provider " Fax #    Physician No Ref-Primary [phone]       No address on file        Equal Access to Services     IKP BRUNNER : Rajani Marin, kip kendall, miltonkasie guevarasalosherri lawszanasherri, rosy iniguez elizabethricardo fisherbetty selinsamiilya schilling. So [facility] [phone].    ATENCIÓN: Si habla español, tiene a olea disposición servicios gratuitos de asistencia lingüística. Llame al [phone].    We comply with applicable federal civil rights laws and [place] laws. We do not discriminate on the basis of race, color, national origin, age, disability, sex, sexual orientation, or gender identity.            Thank you!     Thank you for choosing [facility]  for your care. Our goal is always to provide you with excellent care. Hearing back from our patients is one way we can continue to improve our services. Please take a few minutes to complete the written survey that you may receive in the mail after your visit with us. Thank you!             Your Updated Medication List - Protect others around you: Learn how to safely use, store and throw away your medicines at www.disposemymeds.org.      Notice  As of 7/30/2018  4:30 PM    You have not been prescribed any medications.

## 2018-08-06 ENCOUNTER — THERAPY VISIT (OUTPATIENT)
Dept: OCCUPATIONAL THERAPY | Facility: CLINIC | Age: 58
End: 2018-08-06
Payer: COMMERCIAL

## 2018-08-06 DIAGNOSIS — S62.522A CLOSED DISPLACED FRACTURE OF DISTAL PHALANX OF LEFT THUMB, INITIAL ENCOUNTER: ICD-10-CM

## 2018-08-06 DIAGNOSIS — S53.32XA TRAUMATIC RUPTURE OF LEFT ULNAR COLLATERAL LIGAMENT, INITIAL ENCOUNTER: ICD-10-CM

## 2018-08-06 DIAGNOSIS — Z47.89 AFTERCARE FOLLOWING SURGERY OF THE MUSCULOSKELETAL SYSTEM: ICD-10-CM

## 2018-08-06 DIAGNOSIS — M79.645 PAIN OF LEFT THUMB: ICD-10-CM

## 2018-08-06 PROCEDURE — 97110 THERAPEUTIC EXERCISES: CPT | Mod: GO | Performed by: OCCUPATIONAL THERAPIST

## 2018-08-06 PROCEDURE — 97530 THERAPEUTIC ACTIVITIES: CPT | Mod: GO | Performed by: OCCUPATIONAL THERAPIST

## 2018-08-06 PROCEDURE — 97140 MANUAL THERAPY 1/> REGIONS: CPT | Mod: GO | Performed by: OCCUPATIONAL THERAPIST

## 2018-08-06 NOTE — PROGRESS NOTES
SOAP Note - Hand Therapy - Objective Information    Current Date:  8/6/2018  Referring Physician: Dr. Arnold PIEDRA visit: 9/10/18    Nusrat Romero is a 57 year old right hand dominant female.    Diagnosis:1. Left thumb comminuted fracture of the distal phalanx        2. Rupture of the ulnar collateral ligament, left thumb  DOI:  4/21/18  DOS:  5/4/18  Procedure: 1. Closed reduction and K-wire pinning of left thumb distal phalanx fracture.                     2. Ulnar collateral ligament repair using a Mitek anchor screw.   Post: 13w 3d    Patient reports symptoms of pain, stiffness/loss of motion, weakness/loss of strength, edema and numbness of the left thumb which occurred due to a fall after tripping on a curb.     Occupational Profile Information:  Current occupation is [employer]    S:  Subjective changes as noted by patient:The thumb is stiff and sore today.  By the end of the day yesterday the thumb was more sore and swollen.  Functional changes noted by patient: no changes      O:  Pain:    VAS(0-10) 6/25/18 7/2/18 7/9/18 7/16/18 7/23/18 7/30/18    At Rest:  2/10 2/10 1/10 5/10 1/10 1/10 1/10   With Use:  9/10 5/10 3/10 8/10 3/10 5/10 5/10   At Worst  9/10 5/10 7/10 8/10 7/10 7/10 7/10     Report of Pain:  Location: left thumb   Description: Ache,  Sharp, Shooting,Throbbing  Frequency: Intermittent  Duration: Worse at day  Exacerbated by: Lifting, gripping, pinching, pulling, activity, bumping  Relieved by: ice,  rest  Progression:  unchanged    ROM:    Thumb 6/25/18 6/25/18 7/2/18 7/9/18 7/16/18 7/23/18 7/30/18 8/6/18   AROM(PROM) Right Left Left post Left Left Left Left Left   MP -10/65 -15/32 -3/52 /55 pre /58 post  /60 pre /60   IP +60/60 -5/10 -5/28 /15(25)pre 0(+30)/20 post /24(26) /17 pre /25(33)   RAbd /55 /50         PAbd 50 40         Retropulsion           Opposition  Kapandji Opposition Scale (0-10/10) 10/10 7/10     7/10      Strength:   Pain free (Measured in pounds)   7/9/18 7/23/18    Trials Right Left Left   1  2  3  Av  56  62  60 28  27  31  29 43  52  48  47     Lat Pinch 18   Trials Right Left Left   1  2  3  Av 8 10     3 Pt.  Pinch 18   Trials Right Left Left   1  2  3  Av 6 7+       Edema:  []         None   []         Mild    []         Moderate      []         Severe                  Location:  Edema - Measured circumferentially in cm  Date 18      Right Left Right Left Right Left Right Left Right Left Right Left                   P1 6.1 7.7  7.5  7.0  7.2       IP 6.0 7.7  7.1  6.9                                             Scar:  []        NA           [x]      Adherent      []       Non-adherent       []       Raised     []       Flattened        (L) ulnar side of thumb MP        Palpation:  []         Normal        [x]       Tender       []      Mild         []       Moderate [x]       Severe    Location: (L) ulnar MCP joint and dorsal DIP joint area    Sensation: []                   WNL throughout all nerve distributions; per patient report    [x]                   Decreased  []        Median    []        Ulnar    []        Radial nerve distribution  Patient reports numbness on the ulnar dorsal side of the thumb from the IP joint to tip  Please refer to the daily flowsheet for treatment provided today.     Home Exercise Program:  A/PROM of thumb joints  BROM of thumb MP and IP  Scar massage  Coban wrap for swelling  PROM of thumb MP & IP as tolerated  18:  Dynamic IP flexion orthosis Begin to wear 30 min intervals 3-4 times per day and increase time as tolerated    Next Visit:  Heat if indicated  A/PROM of thumb joints  BROM of thumb MP and IP  Scar massage

## 2018-08-06 NOTE — MR AVS SNAPSHOT
After Visit Summary   8/6/2018    Nusrat Romero    MRN: [id number]           Patient Information     Date Of Birth          1960        Visit Information        Provider Department      8/6/2018 8:00 AM Yodit Lewis, OT [facility]        Today's Diagnoses     Closed displaced fracture of distal phalanx of left thumb, initial encounter        Traumatic rupture of left ulnar collateral ligament, initial encounter        Pain of left thumb        Aftercare following surgery of the musculoskeletal system           Follow-ups after your visit        Your next 10 appointments already scheduled     Aug 13, 2018  8:00 AM CDT   LIZET Hand with Yodit Lewis, OT   [facility] ([facility])    [address]  [address]  [place] [address] [phone]            Aug 20, 2018  8:00 AM CDT   LIZET Hand with Yodit Lewis, OT   [facility] ([facility])    [address]  [address]  [place] [address] [phone]            Aug 27, 2018  8:00 AM CDT   LIZET Hand with Yodit Lewis, OT   [facility] ([facility])    [address]  [address]  [place] [address] [phone]            Sep 10, 2018  4:30 PM CDT   LIZET Hand with Yodit Lewis, OT   [facility] ([facility]    [address]  [address]  [place] [address] [phone]            Sep 17, 2018  8:00 AM CDT   LIZET Hand with Yodit Lewis, OT   [facility] ([facility])    [address]  [address]  [place] [address] [phone]            Sep 24, 2018  8:00 AM CDT   LIZET Hand with Yodit Lewis, OT   [facility] ([facility])    [address]  [address]  [place] [address] [phone]              Who to contact     If you have questions or need follow up information about today's clinic visit or your  schedule please contact [facility] directly at [phone].  Normal or non-critical lab and imaging results will be communicated to you by MyChart, letter or phone within 4 business days after the clinic has received the results. If you do not hear from us within 7 days, please contact the clinic through MyChart or phone. If you have a critical or abnormal lab result, we will notify you by phone as soon as possible.  Submit refill requests through [employer] or call your pharmacy and they will forward the refill request to us. Please allow 3 business days for your refill to be completed.          Additional Information About Your Visit        Care EveryWhere ID     This is your Care EveryWhere ID. This could be used by other organizations to access your [facility] medical records  [id number]         Blood Pressure from Last 3 Encounters:   No data found for BP    Weight from Last 3 Encounters:   No data found for Wt              We Performed the Following     MANUAL THER TECH,1+REGIONS,EA 15 MIN     THERAPEUTIC ACTIVITIES     THERAPEUTIC EXERCISES        Primary Care Provider Fax #    Physician No Ref-Primary [phone]       No address on file        Equal Access to Services     KIP BRUNNER : Hadii brian headley hadasho Soomaali, waaxda luqadaha, qaybta kaalmada adeac, rosy ryan . So [facility] [phone].    ATENCIÓN: Si habla español, tiene a olea disposición servicios gratuitos de asistencia lingüística. Llame al [phone].    We comply with applicable federal civil rights laws and [place] laws. We do not discriminate on the basis of race, color, national origin, age, disability, sex, sexual orientation, or gender identity.            Thank you!     Thank you for choosing [facility]  for your care. Our goal is always to provide you with excellent care. Hearing back from our patients is one way we can continue to improve our services. Please take a few minutes  to complete the written survey that you may receive in the mail after your visit with us. Thank you!             Your Updated Medication List - Protect others around you: Learn how to safely use, store and throw away your medicines at www.disposemymeds.org.      Notice  As of 8/6/2018 10:33 AM    You have not been prescribed any medications.

## 2018-08-13 ENCOUNTER — THERAPY VISIT (OUTPATIENT)
Dept: OCCUPATIONAL THERAPY | Facility: CLINIC | Age: 58
End: 2018-08-13
Payer: COMMERCIAL

## 2018-08-13 DIAGNOSIS — M79.645 PAIN OF LEFT THUMB: ICD-10-CM

## 2018-08-13 DIAGNOSIS — S62.522A CLOSED DISPLACED FRACTURE OF DISTAL PHALANX OF LEFT THUMB, INITIAL ENCOUNTER: ICD-10-CM

## 2018-08-13 DIAGNOSIS — S53.32XA TRAUMATIC RUPTURE OF LEFT ULNAR COLLATERAL LIGAMENT, INITIAL ENCOUNTER: ICD-10-CM

## 2018-08-13 DIAGNOSIS — Z47.89 AFTERCARE FOLLOWING SURGERY OF THE MUSCULOSKELETAL SYSTEM: ICD-10-CM

## 2018-08-13 PROCEDURE — 97110 THERAPEUTIC EXERCISES: CPT | Mod: GO | Performed by: OCCUPATIONAL THERAPIST

## 2018-08-13 PROCEDURE — 97140 MANUAL THERAPY 1/> REGIONS: CPT | Mod: GO | Performed by: OCCUPATIONAL THERAPIST

## 2018-08-13 PROCEDURE — 97530 THERAPEUTIC ACTIVITIES: CPT | Mod: GO | Performed by: OCCUPATIONAL THERAPIST

## 2018-08-13 NOTE — MR AVS SNAPSHOT
After Visit Summary   8/13/2018    Nusrat Romero    MRN: [id number]           Patient Information     Date Of Birth          1960        Visit Information        Provider Department      8/13/2018 8:00 AM Yodit Lewis, OT [facility]        Today's Diagnoses     Closed displaced fracture of distal phalanx of left thumb, initial encounter        Traumatic rupture of left ulnar collateral ligament, initial encounter        Pain of left thumb        Aftercare following surgery of the musculoskeletal system           Follow-ups after your visit        Your next 10 appointments already scheduled     Aug 20, 2018  8:00 AM CDT   LIZET Hand with Chely Perdomo, OT   [facility] ([facility]    [address]  [address]  [place] [address] [phone]            Aug 27, 2018  8:00 AM CDT   LIZET Hand with Yodit Lewis, OT   [facility] ([facility]    [address]  [address]  [place] [address] [phone]            Sep 10, 2018  4:30 PM CDT   LIZET Hand with Yodit Lewis, OT   [facility] ([facility] [facility])    [address]  [address]  [place] [address] [phone]            Sep 17, 2018  8:00 AM CDT   LIZET Hand with Yodit Lewis, OT   [facility]    [address]  [address]  [place] [address] [phone]            Sep 24, 2018  8:00 AM CDT   LIEZT Hand with Yodit Lewis, OT   [facility] ([facility])    [address]  [address]  [place] [address] [phone]              Who to contact     If you have questions or need follow up information about today's clinic visit or your schedule please contact [facility] directly at [phone].  Normal or non-critical lab and imaging results will be communicated to you by MyChart, letter or phone within 4 business days after  the clinic has received the results. If you do not hear from us within 7 days, please contact the clinic through [employer] or phone. If you have a critical or abnormal lab result, we will notify you by phone as soon as possible.  Submit refill requests through [employer] or call your pharmacy and they will forward the refill request to us. Please allow 3 business days for your refill to be completed.          Additional Information About Your Visit        Care EveryWhere ID     This is your Care EveryWhere ID. This could be used by other organizations to access your [facility] medical records  [id number]         Blood Pressure from Last 3 Encounters:   No data found for BP    Weight from Last 3 Encounters:   No data found for Wt              We Performed the Following     MANUAL THER TECH,1+REGIONS,EA 15 MIN     THERAPEUTIC ACTIVITIES     THERAPEUTIC EXERCISES        Primary Care Provider Fax #    Physician No Ref-Primary [phone]       No address on file        Equal Access to Services     KIP BRUNNER : Rajani Marin, waaxda luqadaha, qaybta kaalmasherri barriga, rosy ryan . So [facility] [phone].    ATENCIÓN: Si habla español, tiene a olea disposición servicios gratuitos de asistencia lingüística. [facility] [phone].    We comply with applicable federal civil rights laws and [place] laws. We do not discriminate on the basis of race, color, national origin, age, disability, sex, sexual orientation, or gender identity.            Thank you!     Thank you for choosing [facility]  for your care. Our goal is always to provide you with excellent care. Hearing back from our patients is one way we can continue to improve our services. Please take a few minutes to complete the written survey that you may receive in the mail after your visit with us. Thank you!             Your Updated Medication List - Protect others around you: Learn how to safely use, store and  throw away your medicines at www.disposemymeds.org.      Notice  As of 8/13/2018  8:42 AM    You have not been prescribed any medications.

## 2018-08-13 NOTE — PROGRESS NOTES
SOAP Note - Hand Therapy - Objective Information    Current Date:  8/13/2018  Referring Physician: Dr. Arnold PIEDRA visit: 9/10/18    Nusrat Romero is a 57 year old right hand dominant female.    Diagnosis:1. Left thumb comminuted fracture of the distal phalanx        2. Rupture of the ulnar collateral ligament, left thumb  DOI:  4/21/18  DOS:  5/4/18  Procedure: 1. Closed reduction and K-wire pinning of left thumb distal phalanx fracture.                     2. Ulnar collateral ligament repair using a Mitek anchor screw.   Post: 14w 3d    Patient reports symptoms of pain, stiffness/loss of motion, weakness/loss of strength, edema and numbness of the left thumb which occurred due to a fall after tripping on a curb.     Occupational Profile Information:  Current occupation is [employer]    S:  Subjective changes as noted by patient: the thumb is sore.  I did wrap it last night  Functional changes noted by patient: Able to open a jar of sauerkraut with some pain.      O:  Pain:    VAS(0-10) 6/25/18 7/2/18 7/9/18 7/16/18 7/23/18 7/30/18 8/13/18   At Rest:  2/10 2/10 1/10 5/10 1/10 1/10 2/10   With Use:  9/10 5/10 3/10 8/10 3/10 5/10 5/10   At Worst  9/10 5/10 7/10 8/10 7/10 7/10 5/10     Report of Pain:  Location: left thumb   Description: Ache,  Sharp, Shooting,Throbbing  Frequency: Intermittent  Duration: Worse at day  Exacerbated by: Lifting, gripping, pinching, pulling, activity, bumping  Relieved by: ice,  rest  Progression:  unchanged    ROM:    Thumb 6/25/18 6/25/18 7/2/18 7/9/18 7/16/18 7/23/18 7/30/18 8/6/18 8/13/18   AROM(PROM) Right Left Left post Left Left Left Left Left    MP -10/65 -15/32 -3/52 /55 pre /58 post  /60 pre /60    IP +60/60 -5/10 -5/28 /15(25)pre 0(+30)/20 post /24(26) /17 pre /25(33) /28(35)   RAbd /55 /50          PAbd 50 40          Retropulsion            Opposition  Kapandji Opposition Scale (0-10/10) 10/10 7/10     7/10       Strength:   Pain free (Measured in pounds)    18   Trials Right Left Left   1  2  3  Av  56  62  60 28  27  31  29 43  52  48  47     Lat Pinch 18   Trials Right Left Left   1  2  3  Av 8 10     3 Pt.  Pinch 18   Trials Right Left Left   1  2  3  Av 6 7+       Edema:  []         None   []         Mild    []         Moderate      []         Severe                  Location:  Edema - Measured circumferentially in cm  Date 18     Right Left Right Left Right Left Right Left Right Left Right Left                   P1 6.1 7.7  7.5  7.0  7.2  6.5     IP 6.0 7.7  7.1  6.9    6.3                                         Scar:  []        NA           [x]      Adherent      []       Non-adherent       []       Raised     []       Flattened        (L) ulnar side of thumb MP        Palpation:  []         Normal        [x]       Tender       []      Mild         []       Moderate [x]       Severe    Location: (L) ulnar MCP joint and dorsal DIP joint area    Sensation: []                   WNL throughout all nerve distributions; per patient report    [x]                   Decreased  []        Median    []        Ulnar    []        Radial nerve distribution  Patient reports numbness on the ulnar dorsal side of the thumb from the IP joint to tip  Please refer to the daily flowsheet for treatment provided today.     Home Exercise Program:  A/PROM of thumb joints  BROM of thumb MP and IP  Scar massage  Coban wrap for swelling  PROM of thumb MP & IP as tolerated  18:  Dynamic IP flexion orthosis Begin to wear 30 min intervals 3-4 times per day and increase time as tolerated    Next Visit:  Heat if indicated  A/PROM of thumb joints  BROM of thumb MP and IP  Scar massage

## 2018-08-20 ENCOUNTER — THERAPY VISIT (OUTPATIENT)
Dept: OCCUPATIONAL THERAPY | Facility: CLINIC | Age: 58
End: 2018-08-20
Payer: COMMERCIAL

## 2018-08-20 DIAGNOSIS — Z47.89 AFTERCARE FOLLOWING SURGERY OF THE MUSCULOSKELETAL SYSTEM: ICD-10-CM

## 2018-08-20 DIAGNOSIS — S53.32XA TRAUMATIC RUPTURE OF LEFT ULNAR COLLATERAL LIGAMENT, INITIAL ENCOUNTER: ICD-10-CM

## 2018-08-20 DIAGNOSIS — S62.522A CLOSED DISPLACED FRACTURE OF DISTAL PHALANX OF LEFT THUMB, INITIAL ENCOUNTER: ICD-10-CM

## 2018-08-20 DIAGNOSIS — M79.645 PAIN OF LEFT THUMB: ICD-10-CM

## 2018-08-20 PROCEDURE — 97110 THERAPEUTIC EXERCISES: CPT | Mod: GO | Performed by: OCCUPATIONAL THERAPIST

## 2018-08-20 PROCEDURE — 97140 MANUAL THERAPY 1/> REGIONS: CPT | Mod: GO | Performed by: OCCUPATIONAL THERAPIST

## 2018-08-20 NOTE — MR AVS SNAPSHOT
After Visit Summary   8/20/2018    Nusrat Romero    MRN: [id number]           Patient Information     Date Of Birth          1960        Visit Information        Provider Department      8/20/2018 8:00 AM Chely Perdomo, OT [facility]        Today's Diagnoses     Closed displaced fracture of distal phalanx of left thumb, initial encounter        Traumatic rupture of left ulnar collateral ligament, initial encounter        Pain of left thumb        Aftercare following surgery of the musculoskeletal system           Follow-ups after your visit        Your next 10 appointments already scheduled     Aug 27, 2018  8:00 AM CDT   LIZET Hand with Yodit Lewis, OT   [facility] ([facility]    [address]  [address]  [place] [address] [phone]            Sep 10, 2018  4:30 PM CDT   LIZET Hand with Yodit Lewis, OT   [facility] ([facility])    [address]  [address]  [place] [address] [phone]            Sep 17, 2018  8:00 AM CDT   LIZET Hand with Yodit Lewis, OT   [facility] ([facility]    [address]  [address]  [place] [address] [phone]            Sep 24, 2018  8:00 AM CDT   LIZET Hand with Yodit Lewis, OT   [facility] ([facility])    [address]  [address]  [place] [address] [phone]              Who to contact     If you have questions or need follow up information about today's clinic visit or your schedule please contact [facility] directly at [phone].  Normal or non-critical lab and imaging results will be communicated to you by MyChart, letter or phone within 4 business days after the clinic has received the results. If you do not hear from us within 7 days, please contact the clinic through MyChart or phone. If you have a critical or abnormal lab result, we will notify you by  phone as soon as possible.  Submit refill requests through [email or web address] or call your pharmacy and they will forward the refill request to us. Please allow 3 business days for your refill to be completed.          Additional Information About Your Visit        Care EveryWhere ID     This is your Care EveryWhere ID. This could be used by other organizations to access your [place] medical records  [id number]         Blood Pressure from Last 3 Encounters:   No data found for BP    Weight from Last 3 Encounters:   No data found for Wt              We Performed the Following     MANUAL THER TECH,1+REGIONS,EA 15 MIN     THERAPEUTIC EXERCISES        Primary Care Provider Fax #    Physician No Ref-Primary [phone]       No address on file        Equal Access to Services     [facility]: Hadii brian Marin, waaxda lualisonadaha, qaybta kaalmada linus, rosy ryan . So [facility] [phone].    ATENCIÓN: Si habla español, tiene a olea disposición servicios gratuitos de asistencia lingüística. [facility] [phone].    We comply with applicable federal civil rights laws and [place] laws. We do not discriminate on the basis of race, color, national origin, age, disability, sex, sexual orientation, or gender identity.            Thank you!     Thank you for choosing [facility]  for your care. Our goal is always to provide you with excellent care. Hearing back from our patients is one way we can continue to improve our services. Please take a few minutes to complete the written survey that you may receive in the mail after your visit with us. Thank you!             Your Updated Medication List - Protect others around you: Learn how to safely use, store and throw away your medicines at www.disposemymeds.org.      Notice  As of 8/20/2018 11:19 AM    You have not been prescribed any medications.

## 2018-08-20 NOTE — PROGRESS NOTES
SOAP Note - Hand Therapy - Objective Information    Current Date:  8/20/2018  Referring Physician: Dr. Arnold PIEDRA visit: 9/10/18    Nusrat Romero is a 57 year old right hand dominant female.    Diagnosis:1. Left thumb comminuted fracture of the distal phalanx        2. Rupture of the ulnar collateral ligament, left thumb  DOI:  4/21/18  DOS:  5/4/18  Procedure: 1. Closed reduction and K-wire pinning of left thumb distal phalanx fracture.                     2. Ulnar collateral ligament repair using a Mitek anchor screw.   Post: 15w 3d    Patient reports symptoms of pain, stiffness/loss of motion, weakness/loss of strength, edema and numbness of the left thumb which occurred due to a fall after tripping on a curb.     Occupational Profile Information:  Current occupation is [employer]    S:  Subjective changes as noted by patient: More painful today, feels pretty stiff.  Functional changes noted by patient: It got sore after re arranging my living room this weekend      O:  Pain:    VAS(0-10) 6/25/18 7/2/18 7/9/18 7/16/18 7/23/18 7/30/18 8/13/18 8/20/18   At Rest:  2/10 2/10 1/10 5/10 1/10 1/10 2/10 3/10   With Use:  9/10 5/10 3/10 8/10 3/10 5/10 5/10 5/10   At Worst  9/10 5/10 7/10 8/10 7/10 7/10 5/10 5/10     Report of Pain:  Location: left thumb   Description: Ache,  Sharp, Shooting,Throbbing  Frequency: Intermittent  Duration: Worse at day  Exacerbated by: Lifting, gripping, pinching, pulling, activity, bumping  Relieved by: ice,  rest  Progression:  unchanged    ROM:    Thumb 6/25/18 6/25/18 7/2/18 7/9/18 7/16/18 7/23/18 7/30/18 8/6/18 8/13/18 8/20/18   AROM(PROM) Right Left Left post Left Left Left Left Left  L   MP -10/65 -15/32 -3/52 /55 pre /58 post  /60 pre /60 /66   IP +60/60 -5/10 -5/28 /15(25)pre 0(+30)/20 post /24(26) /17 pre /25(33) /28(35) /25 (post 30)   RAbd /55 /50           PAbd 50 40           Retropulsion             Opposition  [facility] Opposition Scale (0-10/10) 10/10 7/10     7/10         Strength:   Pain free (Measured in pounds)   18   Trials Right Left Left   1  2  3  Av  56  62  60 28  27  31  29 43  52  48  47     Lat Pinch 18   Trials Right Left Left   1  2  3  Av 8 10     3 Pt.  Pinch 18   Trials Right Left Left   1  2  3  Av 6 7+       Edema:  []         None   []         Mild    []         Moderate      []         Severe                  Location:  Edema - Measured circumferentially in cm  Date 18     Right Left Right Left Right Left Right Left Right Left Right Left                   P1 6.1 7.7  7.5  7.0  7.2  6.5     IP 6.0 7.7  7.1  6.9    6.3                                         Scar:  []        NA           [x]      Adherent      []       Non-adherent       []       Raised     []       Flattened        (L) ulnar side of thumb MP        Palpation:  []         Normal        [x]       Tender       []      Mild         []       Moderate [x]       Severe    Location: (L) ulnar MCP joint and dorsal DIP joint area    Sensation: []                   WNL throughout all nerve distributions; per patient report    [x]                   Decreased  []        Median    []        Ulnar    []        Radial nerve distribution  Patient reports numbness on the ulnar dorsal side of the thumb from the IP joint to tip  Please refer to the daily flowsheet for treatment provided today.     Home Exercise Program:  A/PROM of thumb joints  BROM of thumb MP and IP  Scar massage  Coban wrap for swelling  PROM of thumb MP & IP as tolerated  18:  Dynamic IP flexion orthosis Begin to wear 30 min intervals 3-4 times per day and increase time as tolerated    Next Visit:  Heat if indicated  A/PROM of thumb joints  BROM of thumb MP and IP  Scar massage

## 2018-08-27 ENCOUNTER — THERAPY VISIT (OUTPATIENT)
Dept: OCCUPATIONAL THERAPY | Facility: CLINIC | Age: 58
End: 2018-08-27
Payer: COMMERCIAL

## 2018-08-27 DIAGNOSIS — M79.645 PAIN OF LEFT THUMB: ICD-10-CM

## 2018-08-27 DIAGNOSIS — S62.522A CLOSED DISPLACED FRACTURE OF DISTAL PHALANX OF LEFT THUMB, INITIAL ENCOUNTER: ICD-10-CM

## 2018-08-27 DIAGNOSIS — S53.32XA TRAUMATIC RUPTURE OF LEFT ULNAR COLLATERAL LIGAMENT, INITIAL ENCOUNTER: ICD-10-CM

## 2018-08-27 DIAGNOSIS — Z47.89 AFTERCARE FOLLOWING SURGERY OF THE MUSCULOSKELETAL SYSTEM: ICD-10-CM

## 2018-08-27 PROCEDURE — 97110 THERAPEUTIC EXERCISES: CPT | Mod: GO | Performed by: OCCUPATIONAL THERAPIST

## 2018-08-27 PROCEDURE — 97018 PARAFFIN BATH THERAPY: CPT | Mod: GO | Performed by: OCCUPATIONAL THERAPIST

## 2018-08-27 PROCEDURE — 97140 MANUAL THERAPY 1/> REGIONS: CPT | Mod: GO | Performed by: OCCUPATIONAL THERAPIST

## 2018-08-27 NOTE — PROGRESS NOTES
Progress Note - Hand Therapy     Current Date:  8/27/2018  Reporting Period: 6/25/18 to 8/27/18  Referring Physician: Dr. Arnold PIEDRA visit: 9/10/18    Nusrat Romero is a 57 year old right hand dominant female.    Diagnosis:1. Left thumb comminuted fracture of the distal phalanx        2. Rupture of the ulnar collateral ligament, left thumb  DOI:  4/21/18  DOS:  5/4/18  Procedure: 1. Closed reduction and K-wire pinning of left thumb distal phalanx fracture.                     2. Ulnar collateral ligament repair using a Mitek anchor screw.   Post: 16w 3d    Patient reports symptoms of pain, stiffness/loss of motion, weakness/loss of strength, edema and numbness of the left thumb which occurred due to a fall after tripping on a curb.     Occupational Profile Information:  Current occupation is [employer]    S:  Subjective changes as noted by patient: More achy in the thumb muscle and MP joint area.  The end joint is still stiff.  Functional changes noted by patient: Improving     Upper Extremity Functional Index Score:  SCORE:   Column Totals: /80: 69   (A lower score indicates greater disability.)    O:  Pain:    VAS(0-10) 6/25/18 7/2/18 7/9/18 7/16/18 7/23/18 7/30/18 8/13/18 8/20/18 8/27/18   At Rest:  2/10 2/10 1/10 5/10 1/10 1/10 2/10 3/10 5/10   With Use:  9/10 5/10 3/10 8/10 3/10 5/10 5/10 5/10 5/10   At Worst  9/10 5/10 7/10 8/10 7/10 7/10 5/10 5/10 5/10     Report of Pain:  Location: left thumb MP joint area, thenar muscles  Description: Ache,    Frequency: Intermittent  Duration: Worse at day  Exacerbated by: Lifting, gripping, pinching, pulling, activity, bumping  Relieved by: ice,  rest  Progression:  Exacerbation of pain    ROM:    Thumb 6/25/18 6/25/18 7/2/18 7/16/18 7/23/18 8/6/18 8/20/18 8/27/18   AROM(PROM) Right Left Left post Left Left Left L Left   MP -10/65 -15/32 -3/52 /58 post  /60 /66 /60   IP +60/60 -5/10 -5/28 0(+30)/20 post /24(26) /25(33) /25 (post 30) /33(35) post   RAbd /55 /50          PAbd 50 40         Retropulsion           Opposition  [facility] Opposition Scale (0-10/10) 10/10 7/10      8/10       Strength:   Pain free (Measured in pounds)   18   Trials Right Left Left Left   1  2  3  Av  56  62  60 28  27  31  29 43  52  48  47 42  52  49  48     Lat Pinch 18   Trials Right Left Left Left   1  2  3  Av 8 10 12     3 Pt.  Pinch 18   Trials Right Left Left Left   1  2  3  Av 6 7+ 5++       Edema:  []         None   [x]         Mild    []         Moderate      []         Severe                  Location:  Edema - Measured circumferentially in cm  Date 18    Right Left Right Left Right Left Right Left Right Left Right Left                   P1 6.1 7.7  7.5  7.0  7.2  6.5  6.4   IP 6.0 7.7  7.1  6.9    6.3  6.2                                       Scar:  []        NA           [x]      Adherent      []       Non-adherent       []       Raised     []       Flattened        (L) ulnar side of thumb MP        Palpation:  []         Normal        [x]       Tender       []      Mild         []       Moderate [x]       Severe    Location: (L) ulnar MCP joint and dorsal DIP joint area    Sensation: []                   WNL throughout all nerve distributions; per patient report    [x]                   Decreased  []        Median    []        Ulnar    []        Radial nerve distribution  Patient reports numbness on the ulnar dorsal side of the thumb from the IP joint to tip  Please refer to the daily flowsheet for treatment provided today.       Assessment:  Response to therapy has been improvement to:  ROM of Thumb:  Flex, Radial Abduction and Opposition  Flexibility:  tendon gliding is improved  Strength:   and pinch  Edema:  edema is more mobile  Pain:  frequency is less, intensity of pain is decreased, duration of pain is decreased and less tender over affected  area    Overall Assessment:  Patient's symptoms are resolving.  Patient is ready to progress to more complex exercises.  Patient is becoming more independent in home exercise program  Patient would benefit from continued therapy to achieve rehab potential  STG/LTG:  STGoals have been reviewed and progress or achievement has occurred;  see goal sheet for details and updates.    I have re-evaluated this patient and find that the nature, scope, duration and intensity of the therapy is appropriate for the medical condition of the patient.  Plan:  Frequency/Duration:  Recommend continuing with the current treatment plan. 1 X week, once daily  for 6 weeks    Home Exercise Program:  A/PROM of thumb joints  BROM of thumb MP and IP  Scar massage  Coban wrap for swelling  PROM of thumb MP & IP as tolerated  7/30/18:  Dynamic IP flexion orthosis. Wear 30 min intervals 3-4 times per day and increase time as tolerated    Next Visit:  Heat if indicated  A/PROM of thumb joints  BROM of thumb MP and IP  Scar massage

## 2018-08-27 NOTE — LETTER
[facility]  [address]  [address]  [facility] [address]  [phone]    2018    Re: Nusrat Romero   :   1960  MRN:  [id number]   REFERRING PHYSICIAN:   Alex Barrett    [facility]  Date of Initial Evaluation: 18  Visits:  Rxs Used: 10  Reason for Referral:     Closed displaced fracture of distal phalanx of left thumb, initial encounter  Traumatic rupture of left ulnar collateral ligament, initial encounter  Pain of left thumb  Aftercare following surgery of the musculoskeletal system    EVALUATION SUMMARY    Progress Note - Hand Therapy   Current Date:  2018  Reporting Period: 18 to 18  Referring Physician: Dr. Arnold PIEDRA visit: 9/10/18    Nusrat Romero is a 57 year old right hand dominant female.  Diagnosis:1. Left thumb comminuted fracture of the distal phalanx        2. Rupture of the ulnar collateral ligament, left thumb  DOI:  18  DOS:  18  Procedure: 1. Closed reduction and K-wire pinning of left thumb distal phalanx fracture.                     2. Ulnar collateral ligament repair using a Mitek anchor screw.   Post: 16w 3d  Patient reports symptoms of pain, stiffness/loss of motion, weakness/loss of strength, edema and numbness of the left thumb which occurred due to a fall after tripping on a curb.     Occupational Profile Information:  Current occupation is [employer]    S:  Subjective changes as noted by patient: More achy in the thumb muscle and MP joint area.  The end joint is still stiff.  Functional changes noted by patient: Improving     Upper Extremity Functional Index Score:  SCORE:   Column Totals: /80: 69   (A lower score indicates greater disability.)    O:  Pain:    VAS(0-10) 18   At Rest:  2/10 2/10 1/10 5/10 1/10 /10 2/10 3/10 5/10   With Use:  9/10 5/10 3/10 8/10 3/10 5/10 5/10 5/10 5/10   At Worst  9/10 5/10 7/10 8/10 7/10  7/10 5/10 5/10 5/10     Report of Pain:  Location: left thumb MP joint area, thenar muscles  Description: Ache,    Frequency: Intermittent  Duration: Worse at day  Exacerbated by: Lifting, gripping, pinching, pulling, activity, bumping  Relieved by: ice,  rest  Progression:  Exacerbation of pain    ROM:    Thumb 18   AROM(PROM) Right Left Left post Left Left Left L Left   MP -10/65 -15/32 -3/52 /58 post  /   IP + -5/10 - 0(+30)/20 post /(26) /25(33) /25 (post 30) /33(35) post   RAbd /         PAbd 50 40         Retropulsion           Opposition  [facility] Opposition Scale (0-10/10) 10/10 7/10      8/10     Strength:   Pain free (Measured in pounds)   18   Trials Right Left Left Left   1  2  3  Av  56  62  60 28  27  31  29 43  52  48  47 42  52  49  48     Lat Pinch 18   Trials Right Left Left Left   1  2  3  Av 8 10 12     3 Pt.  Pinch 18   Trials Right Left Left Left   1  2  3  Av 6 7+ 5++       Edema:  []         None   [x]         Mild    []         Moderate      []         Severe                Location:  Edema - Measured circumferentially in cm  Date 18    Right Left Right Left Right Left Right Left Right Left Right Left                   P1 6.1 7.7  7.5  7.0  7.2  6.5  6.4   IP 6.0 7.7  7.1  6.9    6.3  6.2     Scar:  []   NA     [x]     Adherent      []       Non-adherent      []    Raised     []       Flattened    (L) ulnar side of thumb MP        Palpation:  []  Normal    [x]     Tender       []      Mild         []       Moderate [x]       Severe    Location: (L) ulnar MCP joint and dorsal DIP joint area    Sensation: [] WNL throughout all nerve distributions; per patient report    [x] Decreased  []   Median    []        Ulnar    []        Radial nerve distribution  Patient reports numbness on the  ulnar dorsal side of the thumb from the IP joint to tip  Please refer to the daily flowsheet for treatment provided today.       Assessment:  Response to therapy has been improvement to:  ROM of Thumb:  Flex, Radial Abduction and Opposition  Flexibility:  tendon gliding is improved  Strength:   and pinch  Edema:  edema is more mobile  Pain:  frequency is less, intensity of pain is decreased, duration of pain is decreased and less tender over affected area    Overall Assessment:  Patient's symptoms are resolving.  Patient is ready to progress to more complex exercises.  Patient is becoming more independent in home exercise program  Patient would benefit from continued therapy to achieve rehab potential  STG/LTG:  STGoals have been reviewed and progress or achievement has occurred;  see goal sheet for details and updates.    I have re-evaluated this patient and find that the nature, scope, duration and intensity of the therapy is appropriate for the medical condition of the patient.  Plan:  Frequency/Duration:  Recommend continuing with the current treatment plan. 1 X week, once daily  for 6 weeks    Home Exercise Program:  A/PROM of thumb joints  BROM of thumb MP and IP  Scar massage  Coban wrap for swelling  PROM of thumb MP & IP as tolerated  7/30/18:  Dynamic IP flexion orthosis. Wear 30 min intervals 3-4 times per day and increase time as tolerated      Next Visit:  Heat if indicated  A/PROM of thumb joints  BROM of thumb MP and IP  Scar massage    Thank you for your referral.    INQUIRIES  Therapist: SIDNEY Atkinson/L, T  [facility]  [address]  [address]  [facility] [address]  Phone: [phone]  Fax: [phone]

## 2018-08-27 NOTE — MR AVS SNAPSHOT
After Visit Summary   8/27/2018    Nusrat Romero    MRN: [id number]           Patient Information     Date Of Birth          1960        Visit Information        Provider Department      8/27/2018 8:00 AM Yodit Lewis, OT [facility]        Today's Diagnoses     Closed displaced fracture of distal phalanx of left thumb, initial encounter        Traumatic rupture of left ulnar collateral ligament, initial encounter        Pain of left thumb        Aftercare following surgery of the musculoskeletal system           Follow-ups after your visit        Your next 10 appointments already scheduled     Sep 10, 2018  4:30 PM CDT   LIZET Hand with Yodit Lewis, OT   [facility] ([facility])    [address]  [address]  [place] [address] [phone]            Sep 17, 2018  8:00 AM CDT   LIZET Hand with Yodit Lewis, OT   [facility] ([facility])    [address]  [address]  [place] [address] [phone]            Sep 24, 2018  8:00 AM CDT   LIZET Hand with Yodit Lewis, OT   [facility] ([facility])    [address]  [address]  [place] [address] [phone]              Who to contact     If you have questions or need follow up information about today's clinic visit or your schedule please contact [facility] directly at [phone].  Normal or non-critical lab and imaging results will be communicated to you by MyChart, letter or phone within 4 business days after the clinic has received the results. If you do not hear from us within 7 days, please contact the clinic through MyChart or phone. If you have a critical or abnormal lab result, we will notify you by phone as soon as possible.  Submit refill requests through [employer] or call your pharmacy and they will forward the refill request to us. Please allow 3 business days for your refill to be  completed.          Additional Information About Your Visit        Care EveryWhere ID     This is your Care EveryWhere ID. This could be used by other organizations to access your [facility] medical records  [id number]         Blood Pressure from Last 3 Encounters:   No data found for BP    Weight from Last 3 Encounters:   No data found for Wt              We Performed the Following     MANUAL THER TECH,1+REGIONS,EA 15 MIN     [facility] BATH THERAPY     PROGRESS NOTE REPORT     THERAPEUTIC EXERCISES        Primary Care Provider Fax #    Physician No Ref-Primary [phone]       No address on file        Equal Access to Services     KIP BRUNNER : Hadii aad ku hadasho Soomaali, waaxda luqadaha, qaybta kaalmada adeegyada, waxay idiin hayaan adeeg shira ryan . So [facility] [phone].    ATENCIÓN: Si habla español, tiene a olea disposición servicios gratuitos de asistencia lingüística. [facility] [phone].    We comply with applicable federal civil rights laws and [place] laws. We do not discriminate on the basis of race, color, national origin, age, disability, sex, sexual orientation, or gender identity.            Thank you!     Thank you for choosing [facility]  for your care. Our goal is always to provide you with excellent care. Hearing back from our patients is one way we can continue to improve our services. Please take a few minutes to complete the written survey that you may receive in the mail after your visit with us. Thank you!             Your Updated Medication List - Protect others around you: Learn how to safely use, store and throw away your medicines at www.disposemymeds.org.      Notice  As of 8/27/2018  9:47 AM    You have not been prescribed any medications.

## 2018-09-10 ENCOUNTER — THERAPY VISIT (OUTPATIENT)
Dept: OCCUPATIONAL THERAPY | Facility: CLINIC | Age: 58
End: 2018-09-10
Payer: COMMERCIAL

## 2018-09-10 DIAGNOSIS — S62.522A CLOSED DISPLACED FRACTURE OF DISTAL PHALANX OF LEFT THUMB, INITIAL ENCOUNTER: ICD-10-CM

## 2018-09-10 DIAGNOSIS — M79.645 PAIN OF LEFT THUMB: ICD-10-CM

## 2018-09-10 DIAGNOSIS — S53.32XA TRAUMATIC RUPTURE OF LEFT ULNAR COLLATERAL LIGAMENT, INITIAL ENCOUNTER: ICD-10-CM

## 2018-09-10 DIAGNOSIS — Z47.89 AFTERCARE FOLLOWING SURGERY OF THE MUSCULOSKELETAL SYSTEM: ICD-10-CM

## 2018-09-10 NOTE — PROGRESS NOTES
Patient seen MD this morning and he stated that therapy is no longer indicated at this time.  Patient has an appointment next Monday for review of x-rays and possible surgical intervention. No therapy treatment today  Disregard this encounter.

## 2018-09-10 NOTE — MR AVS SNAPSHOT
After Visit Summary   9/10/2018    Nusrat Romero    MRN: [id number]           Patient Information     Date Of Birth          1960        Visit Information        Provider Department      9/10/2018 4:30 PM Yodit Lewis OT [facility]        Today's Diagnoses     Closed displaced fracture of distal phalanx of left thumb, initial encounter        Traumatic rupture of left ulnar collateral ligament, initial encounter        Pain of left thumb        Aftercare following surgery of the musculoskeletal system           Follow-ups after your visit        Who to contact     If you have questions or need follow up information about today's clinic visit or your schedule please contact [facility] directly at [phone].  Normal or non-critical lab and imaging results will be communicated to you by MyChart, letter or phone within 4 business days after the clinic has received the results. If you do not hear from us within 7 days, please contact the clinic through MyChart or phone. If you have a critical or abnormal lab result, we will notify you by phone as soon as possible.  Submit refill requests through [employer] or call your pharmacy and they will forward the refill request to us. Please allow 3 business days for your refill to be completed.          Additional Information About Your Visit        Care EveryWhere ID     This is your Care EveryWhere ID. This could be used by other organizations to access your [facility] records  [id number]         Blood Pressure from Last 3 Encounters:   No data found for BP    Weight from Last 3 Encounters:   No data found for Wt              We Performed the Following     NO CHARGE        Primary Care Provider Fax #    Physician No Ref-Primary [phone]       No address on file        Equal Access to Services     KIP BRUNNER : Rajani Marin, kip kendall, rosy reese  shira ryan ah. So [facility] [phone].    ATENCIÓN: Si habla español, tiene a olea disposición servicios gratuitos de asistencia lingüística. Emil al [phone].    We comply with applicable federal civil rights laws and [place] laws. We do not discriminate on the basis of race, color, national origin, age, disability, sex, sexual orientation, or gender identity.            Thank you!     Thank you for choosing [facility]  for your care. Our goal is always to provide you with excellent care. Hearing back from our patients is one way we can continue to improve our services. Please take a few minutes to complete the written survey that you may receive in the mail after your visit with us. Thank you!             Your Updated Medication List - Protect others around you: Learn how to safely use, store and throw away your medicines at www.disposemymeds.org.      Notice  As of 9/10/2018  4:50 PM    You have not been prescribed any medications.

## 2019-01-14 PROBLEM — S62.522A CLOSED DISPLACED FRACTURE OF DISTAL PHALANX OF LEFT THUMB, INITIAL ENCOUNTER: Status: RESOLVED | Noted: 2018-06-25 | Resolved: 2019-01-14

## 2019-01-14 PROBLEM — S53.32XA: Status: RESOLVED | Noted: 2018-06-25 | Resolved: 2019-01-14

## 2019-01-14 PROBLEM — Z47.89 AFTERCARE FOLLOWING SURGERY OF THE MUSCULOSKELETAL SYSTEM: Status: RESOLVED | Noted: 2018-06-25 | Resolved: 2019-01-14

## 2019-01-14 PROBLEM — M79.645 PAIN OF LEFT THUMB: Status: RESOLVED | Noted: 2018-06-25 | Resolved: 2019-01-14
